# Patient Record
Sex: FEMALE | Race: BLACK OR AFRICAN AMERICAN | NOT HISPANIC OR LATINO | Employment: OTHER | URBAN - METROPOLITAN AREA
[De-identification: names, ages, dates, MRNs, and addresses within clinical notes are randomized per-mention and may not be internally consistent; named-entity substitution may affect disease eponyms.]

---

## 2023-01-31 ENCOUNTER — ANCILLARY PROCEDURE (OUTPATIENT)
Dept: GENERAL RADIOLOGY | Facility: CLINIC | Age: 28
End: 2023-01-31
Attending: PEDIATRICS
Payer: COMMERCIAL

## 2023-01-31 ENCOUNTER — OFFICE VISIT (OUTPATIENT)
Dept: ORTHOPEDICS | Facility: CLINIC | Age: 28
End: 2023-01-31
Payer: COMMERCIAL

## 2023-01-31 VITALS
BODY MASS INDEX: 28.12 KG/M2 | DIASTOLIC BLOOD PRESSURE: 62 MMHG | SYSTOLIC BLOOD PRESSURE: 108 MMHG | WEIGHT: 175 LBS | HEIGHT: 66 IN

## 2023-01-31 DIAGNOSIS — M79.661 PAIN IN RIGHT LOWER LEG: ICD-10-CM
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
## 2023-01-31 DIAGNOSIS — S46.812A TRAPEZIUS STRAIN, LEFT, INITIAL ENCOUNTER: ICD-10-CM

## 2023-01-31 PROCEDURE — 73590 X-RAY EXAM OF LOWER LEG: CPT | Mod: TC | Performed by: RADIOLOGY

## 2023-01-31 PROCEDURE — 99203 OFFICE O/P NEW LOW 30 MIN: CPT | Performed by: PEDIATRICS
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
## 2023-01-31 NOTE — PATIENT INSTRUCTIONS
Left neck/shoulder area pain is more in the area of the upper trapezius, consistent with soft tissue strain.  No concern about neck issues, given the overall range of motion that you have.  No concern about obvious shoulder issues, also given the overall range of motion and function there.  Right lower leg pain suspect related to contusion.  X-ray is reassuring today.  For symptom management, we discussed icing, heat, over-the-counter medication if needed.  Would offer prescription for muscle relaxant, if interested.  Hold for now, but if desiring this, you may contact clinic.  Otherwise, plan physical therapy next, referral placed.  Plan to monitor course with PT 3 to 4 weeks to start.    If you have any further questions for your physician or physician s care team you can contact them thru WhistleTalkhart or by calling  [phone] and use option 3 to leave a voice message.   Messages received during business hours will be returned same day.

## 2024-01-19 ENCOUNTER — OFFICE VISIT (OUTPATIENT)
Dept: INTERNAL MEDICINE | Facility: CLINIC | Age: 29
End: 2024-01-19
Payer: COMMERCIAL

## 2024-01-19 VITALS
OXYGEN SATURATION: 100 % | WEIGHT: 176 LBS | TEMPERATURE: 97.7 F | RESPIRATION RATE: 14 BRPM | SYSTOLIC BLOOD PRESSURE: 101 MMHG | BODY MASS INDEX: 28.41 KG/M2 | DIASTOLIC BLOOD PRESSURE: 71 MMHG | HEART RATE: 100 BPM

## 2024-01-19 DIAGNOSIS — Z11.1 SCREENING FOR TUBERCULOSIS: Primary | ICD-10-CM

## 2024-01-19 DIAGNOSIS — Z11.4 SCREENING FOR HIV (HUMAN IMMUNODEFICIENCY VIRUS): ICD-10-CM

## 2024-01-19 DIAGNOSIS — Z11.59 NEED FOR HEPATITIS C SCREENING TEST: ICD-10-CM

## 2024-01-19 DIAGNOSIS — Z12.4 CERVICAL CANCER SCREENING: ICD-10-CM

## 2024-01-19 PROCEDURE — 99202 OFFICE O/P NEW SF 15 MIN: CPT

## 2024-01-19 PROCEDURE — 86481 TB AG RESPONSE T-CELL SUSP: CPT

## 2024-01-19 PROCEDURE — 36415 COLL VENOUS BLD VENIPUNCTURE: CPT

## 2024-01-19 NOTE — PROGRESS NOTES
"  Assessment & Plan     (Z11.1) Screening for tuberculosis  (primary encounter diagnosis)  Comment: Patient was seen today for TB testing. Patient employer wants patient tested per their policy. Discussed doing Quantiferon TB Gold Plus  Plan: REVIEW OF HEALTH MAINTENANCE PROTOCOL ORDERS,         Quantiferon TB Gold Plus        Pending   Send result to patient email [email or web address]              BMI  Estimated body mass index is 28.41 kg/m  as calculated from the following:    Height as of 1/31/23: 1.676 m (5' 6\").    Weight as of this encounter: 79.8 kg (176 lb).         Nadege Carpenter is a 29 year old, presenting for the following health issues:  Imm/Inj (TB TEST FOR WORK )         No data to display              Imm/Inj    History of Present Illness       Reason for visit:  Tp test    She eats 0-1 servings of fruits and vegetables daily.She consumes 0 sweetened beverage(s) daily. She exercises with enough effort to increase her heart rate 3 or less days per week.   She is taking medications regularly.                 Review of Systems  CONSTITUTIONAL: NEGATIVE for fever, chills, change in weight  ENT/MOUTH: NEGATIVE for ear, mouth and throat problems  RESP: NEGATIVE for significant cough or SOB  CV: NEGATIVE for chest pain, palpitations or peripheral edema      Objective    LMP 01/01/2024   There is no height or weight on file to calculate BMI.  Physical Exam   GENERAL: alert and no distress  EYES: Eyes grossly normal to inspection, PERRL and conjunctivae and sclerae normal  HENT: ear canals and TM's normal, nose and mouth without ulcers or lesions  NECK: no adenopathy, no asymmetry, masses, or scars  RESP: lungs clear to auscultation - no rales, rhonchi or wheezes  CV: regular rate and rhythm, normal S1 S2, no S3 or S4, no murmur, click or rub, no peripheral edema  ABDOMEN: soft, nontender, no hepatosplenomegaly, no masses and bowel sounds normal  MS: no gross musculoskeletal defects noted, no " edema  SKIN: no suspicious lesions or rashes  NEURO: Normal strength and tone, mentation intact and speech normal  PSYCH: mentation appears normal, affect normal/bright  LYMPH: no cervical, supraclavicular, axillary, or inguinal adenopathy    Results for orders placed or performed in visit on 01/19/24   Quantiferon TB Gold Plus     Status: None (In process)    Specimen: Peripheral Blood    Narrative    The following orders were created for panel order Quantiferon TB Gold Plus.  Procedure                               Abnormality         Status                     ---------                               -----------         ------                     Quantiferon TB Gold Plus...[[id number]]                      In process                 Quantiferon TB Gold Plus...[[id number]]                      In process                 Quantiferon TB Gold Plus...[[id number]]                      In process                 Quantiferon TB Gold Plus...[[id number]]                      In process                   Please view results for these tests on the individual orders.           Signed Electronically by: CHIP Carrillo CNP

## 2024-01-21 LAB
GAMMA INTERFERON BACKGROUND BLD IA-ACNC: 0.07 IU/ML
M TB IFN-G BLD-IMP: POSITIVE
M TB IFN-G CD4+ BCKGRND COR BLD-ACNC: 9.93 IU/ML
MITOGEN IGNF BCKGRD COR BLD-ACNC: 0.41 IU/ML
MITOGEN IGNF BCKGRD COR BLD-ACNC: 0.63 IU/ML
QUANTIFERON MITOGEN: 10 IU/ML
QUANTIFERON NIL TUBE: 0.07 IU/ML
QUANTIFERON TB1 TUBE: 0.7 IU/ML
QUANTIFERON TB2 TUBE: 0.48

## 2024-01-22 ENCOUNTER — TELEPHONE (OUTPATIENT)
Dept: FAMILY MEDICINE | Facility: CLINIC | Age: 29
End: 2024-01-22
Payer: COMMERCIAL

## 2024-01-22 ENCOUNTER — TELEPHONE (OUTPATIENT)
Dept: INTERNAL MEDICINE | Facility: CLINIC | Age: 29
End: 2024-01-22
Payer: COMMERCIAL

## 2024-01-22 DIAGNOSIS — R76.12 REACTION TO QUANTIFERON-TB TEST (QFT) WITHOUT ACTIVE TUBERCULOSIS: Primary | ICD-10-CM

## 2024-01-22 NOTE — TELEPHONE ENCOUNTER
Order/Referral Request    Who is requesting: imaging    Orders being requested: referral for x-ray     Reason service is needed/diagnosis: false positive     When are orders needed by: as soon as possible    Has this been discussed with Provider: Yes    Does patient have a preference on a Group/Provider/Facility? none    Does patient have an appointment scheduled?: No    Where to send orders: Place orders within Epic    Okay to leave a detailed message?: Yes at Cell number on file:    Telephone Information:   Mobile [phone]

## 2024-01-22 NOTE — TELEPHONE ENCOUNTER
Hamzah,    See TE from today. Below was already discussed.     There is no xray ordered. Please order.     Thanks,  JESSA Ariza  [facility]

## 2024-01-22 NOTE — TELEPHONE ENCOUNTER
Attempted to call, left vm asking return call.     Thanks,  JESSA Ariza  [facility]

## 2024-01-22 NOTE — TELEPHONE ENCOUNTER
----- Message from CHIP Carrillo CNP sent at 1/22/2024 10:26 AM CST -----  Patient needs to be called and told that her TB test came back positive and we need to do a repeat Quantiferon TB test and a chest Xray. It is common to have a false positive. She was asymptomatic and took the test just for her employer. I will put in the order and she will need to schedule with lab and X-ray.     Thanks,  Gayatri SAUNDERS., CNP

## 2024-01-22 NOTE — TELEPHONE ENCOUNTER
Pt returned call- reviewed lab result with her       Gave her number for x ray scheduling.  Needs to repeat lab as well      JESSA Inman    Triage Nurse  [facility]

## 2024-01-24 ENCOUNTER — ANCILLARY PROCEDURE (OUTPATIENT)
Dept: GENERAL RADIOLOGY | Facility: CLINIC | Age: 29
End: 2024-01-24
Payer: COMMERCIAL

## 2024-01-24 ENCOUNTER — TELEPHONE (OUTPATIENT)
Dept: INTERNAL MEDICINE | Facility: CLINIC | Age: 29
End: 2024-01-24

## 2024-01-24 DIAGNOSIS — R76.12 REACTION TO QUANTIFERON-TB TEST (QFT) WITHOUT ACTIVE TUBERCULOSIS: ICD-10-CM

## 2024-01-24 PROCEDURE — 71046 X-RAY EXAM CHEST 2 VIEWS: CPT | Mod: TC | Performed by: RADIOLOGY

## 2024-01-24 NOTE — TELEPHONE ENCOUNTER
Pt is calling to get an update on the referral that is needed for her to get an x-ray. Pt states she needs this for work so she's hoping to get the orders as soon as possible.

## 2024-01-24 NOTE — TELEPHONE ENCOUNTER
Reason for Call:  Request for results:    Name of test or procedure:  xray. Patient needs to know if she has TB or not for your work. Please email  [email or web address]    Date of test of procedure: 01/24/2024    Location of the test or procedure:  [facility] to leave the result message on voice mail or with a family member? YES    Phone number Patient can be reached at:  Cell number on file:    Telephone Information:   Mobile [phone]       Additional comments: n/A    Call taken on 1/24/2024 at 5:14 PM by DENA STANLEY

## 2024-01-25 ENCOUNTER — LAB (OUTPATIENT)
Dept: LAB | Facility: CLINIC | Age: 29
End: 2024-01-25
Payer: COMMERCIAL

## 2024-01-25 DIAGNOSIS — R76.12 REACTION TO QUANTIFERON-TB TEST (QFT) WITHOUT ACTIVE TUBERCULOSIS: ICD-10-CM

## 2024-01-25 DIAGNOSIS — Z11.59 NEED FOR HEPATITIS C SCREENING TEST: ICD-10-CM

## 2024-01-25 DIAGNOSIS — Z11.4 SCREENING FOR HIV (HUMAN IMMUNODEFICIENCY VIRUS): Primary | ICD-10-CM

## 2024-01-25 LAB — HIV 1+2 AB+HIV1 P24 AG SERPL QL IA: NONREACTIVE

## 2024-01-25 PROCEDURE — 87389 HIV-1 AG W/HIV-1&-2 AB AG IA: CPT

## 2024-01-25 PROCEDURE — 86481 TB AG RESPONSE T-CELL SUSP: CPT

## 2024-01-25 PROCEDURE — 36415 COLL VENOUS BLD VENIPUNCTURE: CPT

## 2024-01-25 PROCEDURE — 86803 HEPATITIS C AB TEST: CPT

## 2024-01-25 NOTE — TELEPHONE ENCOUNTER
Copy of 1-24-24, chest x-ray report printed and left at  for patient to .    Fiorella Menjivar,

## 2024-01-25 NOTE — TELEPHONE ENCOUNTER
TC- Pt will be in clinic at 11:30 today for lab and would like to  a a paper copy of chest x-ray results from 01/24/24. Please print and place at  for patient.       See other telephone encounters from 01/22/24.   Called and spoke with patient. She has already completed chest x-ray. She has not done repeat quantieron TB blood draw yet. Scheduled lab appt for 01/25.    Francine Vu RN

## 2024-01-26 LAB — HCV AB SERPL QL IA: NONREACTIVE

## 2024-01-27 LAB
GAMMA INTERFERON BACKGROUND BLD IA-ACNC: 0.08 IU/ML
M TB IFN-G BLD-IMP: POSITIVE
M TB IFN-G CD4+ BCKGRND COR BLD-ACNC: 9.92 IU/ML
MITOGEN IGNF BCKGRD COR BLD-ACNC: 0.19 IU/ML
MITOGEN IGNF BCKGRD COR BLD-ACNC: 0.46 IU/ML
QUANTIFERON MITOGEN: 10 IU/ML
QUANTIFERON NIL TUBE: 0.08 IU/ML
QUANTIFERON TB1 TUBE: 0.54 IU/ML
QUANTIFERON TB2 TUBE: 0.27

## 2024-03-22 ENCOUNTER — OFFICE VISIT (OUTPATIENT)
Dept: OPTOMETRY | Facility: CLINIC | Age: 29
End: 2024-03-22
Payer: MEDICAID

## 2024-03-22 DIAGNOSIS — H52.223 REGULAR ASTIGMATISM OF BOTH EYES: ICD-10-CM

## 2024-03-22 DIAGNOSIS — H52.13 MYOPIA OF BOTH EYES: ICD-10-CM

## 2024-03-22 DIAGNOSIS — H47.393 OPTIC NERVE CUPPING OF BOTH EYES: ICD-10-CM

## 2024-03-22 DIAGNOSIS — Z01.01 ENCOUNTER FOR EXAMINATION OF EYES AND VISION WITH ABNORMAL FINDINGS: Primary | ICD-10-CM

## 2024-03-22 DIAGNOSIS — H50.111 EXOTROPIA OF RIGHT EYE: ICD-10-CM

## 2024-03-22 PROBLEM — H10.13 ALLERGIC CONJUNCTIVITIS OF BOTH EYES: Status: ACTIVE | Noted: 2020-07-14

## 2024-03-22 PROBLEM — M22.2X1 PATELLOFEMORAL PAIN SYNDROME OF BOTH KNEES: Status: ACTIVE | Noted: 2020-05-11

## 2024-03-22 PROBLEM — R63.1 POLYDIPSIA: Status: ACTIVE | Noted: 2022-05-13

## 2024-03-22 PROBLEM — M22.2X2 PATELLOFEMORAL PAIN SYNDROME OF BOTH KNEES: Status: ACTIVE | Noted: 2020-05-11

## 2024-03-22 PROBLEM — J45.30 MILD PERSISTENT ASTHMA WITHOUT COMPLICATION: Status: ACTIVE | Noted: 2020-07-14

## 2024-03-22 PROBLEM — E66.09 OTHER OBESITY DUE TO EXCESS CALORIES: Status: ACTIVE | Noted: 2020-05-11

## 2024-03-22 PROBLEM — J45.990 EXERCISE INDUCED BRONCHOSPASM: Status: ACTIVE | Noted: 2019-04-29

## 2024-03-22 PROBLEM — M54.50 LUMBAR PAIN: Status: ACTIVE | Noted: 2020-05-11

## 2024-03-22 PROBLEM — M54.2 CERVICALGIA: Status: ACTIVE | Noted: 2020-05-11

## 2024-03-22 PROBLEM — M23.41 LOOSE BODY OF RIGHT KNEE: Status: ACTIVE | Noted: 2019-04-29

## 2024-03-22 PROBLEM — N92.1 MENORRHAGIA WITH IRREGULAR CYCLE: Status: ACTIVE | Noted: 2022-05-13

## 2024-03-22 PROBLEM — R29.898 WEAKNESS OF BOTH LOWER EXTREMITIES: Status: ACTIVE | Noted: 2020-05-11

## 2024-03-22 PROCEDURE — 92004 COMPRE OPH EXAM NEW PT 1/>: CPT | Performed by: OPTOMETRIST

## 2024-03-22 PROCEDURE — 92015 DETERMINE REFRACTIVE STATE: CPT | Performed by: OPTOMETRIST

## 2024-03-22 ASSESSMENT — REFRACTION_MANIFEST
OS_CYLINDER: +1.00
OS_SPHERE: -1.50
OD_CYLINDER: +2.75
OD_SPHERE: -2.75
OS_AXIS: 098
OD_SPHERE: -2.25
OD_AXIS: 080
OS_CYLINDER: +1.00
OD_CYLINDER: +2.25
OD_AXIS: 084
OS_SPHERE: -1.50
OS_AXIS: 107
METHOD_AUTOREFRACTION: 1

## 2024-03-22 ASSESSMENT — CONF VISUAL FIELD
OD_NORMAL: 1
OD_INFERIOR_TEMPORAL_RESTRICTION: 0
OD_INFERIOR_NASAL_RESTRICTION: 0
METHOD: COUNTING FINGERS
OD_SUPERIOR_NASAL_RESTRICTION: 0
OS_INFERIOR_TEMPORAL_RESTRICTION: 0
OS_SUPERIOR_NASAL_RESTRICTION: 0
OS_INFERIOR_NASAL_RESTRICTION: 0
OS_SUPERIOR_TEMPORAL_RESTRICTION: 0
OS_NORMAL: 1
OD_SUPERIOR_TEMPORAL_RESTRICTION: 0

## 2024-03-22 ASSESSMENT — SLIT LAMP EXAM - LIDS
COMMENTS: NORMAL
COMMENTS: NORMAL

## 2024-03-22 ASSESSMENT — EXTERNAL EXAM - LEFT EYE: OS_EXAM: NORMAL

## 2024-03-22 ASSESSMENT — VISUAL ACUITY
OD_SC: 20/80+1
OD_SC: 20/50
OD_SC+: -1
METHOD: SNELLEN - LINEAR
OS_SC: 20/30
OS_SC: 20/20
OS_SC+: -2

## 2024-03-22 ASSESSMENT — KERATOMETRY
OD_AXISANGLE_DEGREES: 078
OD_K2POWER_DIOPTERS: 43.75
OS_AXISANGLE_DEGREES: 102
OS_K1POWER_DIOPTERS: 42.00
OD_K1POWER_DIOPTERS: 41.75
OS_K2POWER_DIOPTERS: 43.25
OD_AXISANGLE2_DEGREES: 168
OS_AXISANGLE2_DEGREES: 012

## 2024-03-22 ASSESSMENT — CUP TO DISC RATIO
OS_RATIO: 0.55
OD_RATIO: 0.6

## 2024-03-22 ASSESSMENT — TONOMETRY
OD_IOP_MMHG: 17
IOP_METHOD: APPLANATION
OS_IOP_MMHG: 15

## 2024-03-22 ASSESSMENT — EXTERNAL EXAM - RIGHT EYE: OD_EXAM: NORMAL

## 2024-03-22 NOTE — PATIENT INSTRUCTIONS
Updated glasses prescription provided today.   Allow 2 weeks to adapt to the new glasses.   Wear glasses for driving.     Large optic nerve cupping with healthy rim. Monitor.     Return in 1 year for a comprehensive eye exam, or sooner if needed.      The effects of the dilating drops last for 4- 6 hours.  You will be more sensitive to light and vision will be blurry up close.  Mydriatic sunglasses were given if needed.     Bunny Carroll, OD  [address]  [place]  [address] [phone]

## 2024-03-22 NOTE — LETTER
3/22/2024         RE: Pauline Bautista  [address] [place] [address]        Dear Colleague,    Thank you for referring your patient, Pauline Bautista, to the [facility]. Please see a copy of my visit note below.    Chief Complaint   Patient presents with     Annual Eye Exam     -PT states she was dx with C/d asymmetry in [place]       Last Eye Exam: 5+ yrs - [place]   Dilated Previously: Yes, side effects of dilation explained today    What are you currently using to see?  does not use glasses or contacts - has had glasses in past but not for 10+ yrs        Distance Vision Acuity: Noticed gradual change in both eyes - much harder time seeing at night     Near Vision Acuity: Satisfied with vision while reading and using computer unaided    Eye Comfort: good  Do you use eye drops? : No  Occupation or Hobbies: home - lots of screen time     Manda Aguirre  Optometry Assistant          Medical, surgical and family histories reviewed and updated 3/22/2024.       OBJECTIVE: See Ophthalmology exam    ASSESSMENT:    ICD-10-CM    1. Encounter for examination of eyes and vision with abnormal findings  Z01.01       2. Exotropia of right eye  H50.111       3. 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 Myopia of both eyes  H52.13       5. Regular astigmatism of both eyes  H52.223           PLAN:     Patient Instructions   Updated glasses prescription provided today.   Allow 2 weeks to adapt to the new glasses.   Wear glasses for driving.     Large optic nerve cupping with healthy rim. Monitor.     Return in 1 year for a comprehensive eye exam, or sooner if needed.      The effects of the dilating drops last for 4- 6 hours.  You will be more sensitive to light and vision will be blurry up close.  Mydriatic sunglasses were given if needed.     Bunny Carroll, OD  [facility]  [address] [place]  [address] [phone]       Again, thank you for allowing me to  participate in the care of your patient.        Sincerely,        Bunny Carroll OD
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
## 2024-04-04 ENCOUNTER — TELEPHONE (OUTPATIENT)
Dept: INTERNAL MEDICINE | Facility: CLINIC | Age: 29
End: 2024-04-04
Payer: COMMERCIAL

## 2024-04-04 NOTE — LETTER
April 4, 2024      Pauline GRIS Ali  [id number]  [place] [address]        April 4, 2024      Pauline GRIS Ali  [address] [facility]  [place] [address]    Your team at [facility] cares about your health. We have reviewed your chart and based on our findings; we are making the following recommendations to better manage your health.     You are in particular need of attention regarding the following:     Schedule a primary care office visit with your provider for a Pap Smear to screen for Cervical Cancer.  PREVENTATIVE VISIT: Physical    If you have already completed these items, please contact the clinic via phone or   [employer] so your care team can review and update your records. Thank you for   choosing [facility] for your healthcare needs. For any questions,   concerns, or to schedule an appointment please contact our clinic.    Healthy Regards,      Your [facility] Care Team              Sincerely,        CHIP Carrillo CNP

## 2024-04-04 NOTE — TELEPHONE ENCOUNTER
Patient Quality Outreach    Patient is due for the following:   Asthma  -  ACT needed  Cervical Cancer Screening - PAP Needed  Physical Preventive Adult Physical    Next Steps:   Schedule a Adult Preventative    Type of outreach:    Sent letter.    Next Steps:  Reach out within 90 days via Letter.    Max number of attempts reached: Yes. Will try again in 90 days if patient still on fail list.    Questions for provider review:    None           Elizabeth Ni, [facility]  Chart routed to closing chart .

## 2024-05-14 ENCOUNTER — APPOINTMENT (OUTPATIENT)
Dept: OPTOMETRY | Facility: CLINIC | Age: 29
End: 2024-05-14
Payer: COMMERCIAL

## 2024-05-14 PROCEDURE — 92340 FIT SPECTACLES MONOFOCAL: CPT | Performed by: OPTOMETRIST

## 2024-05-31 ENCOUNTER — TELEPHONE (OUTPATIENT)
Dept: NURSING | Facility: CLINIC | Age: 29
End: 2024-05-31
Payer: COMMERCIAL

## 2024-05-31 NOTE — TELEPHONE ENCOUNTER
I can't find any call made to patient?  Patient called and informed.     Informed her that she is due for a physical.  See 4-4-24 encounter.  Patient declined to schedule.  Fiorella Menjivar,

## 2024-05-31 NOTE — TELEPHONE ENCOUNTER
Patient Returning Call    Reason for call:  Pt returning call from clinic     Information relayed to patient:  None    Patient has additional questions:  No    What are your questions/concerns:  Pt wants to know the reason for the call      Okay to leave a detailed message?: Yes at Cell number on file:    Telephone Information:   Mobile [phone]

## 2024-12-18 ENCOUNTER — TELEPHONE (OUTPATIENT)
Dept: INTERNAL MEDICINE | Facility: CLINIC | Age: 29
End: 2024-12-18
Payer: COMMERCIAL

## 2024-12-18 NOTE — LETTER
December 18, 2024      Pauline GRIS Ali  [address]  [place] [address]        December 18, 2024      Pauline GRIS Ali  [address] [facility]  [place] [address]    Your team at [facility] cares about your health. We have reviewed your chart and based on our findings; we are making the following recommendations to better manage your health.     You are in particular need of attention regarding the following:     Asthma Control Test     This screening tool helps us to assess how well your asthma is controlled.Good asthma control leads to fewer asthma symptoms and greater health. If your asthma is not in good control (score is 19 or less) or you have been to the ER or urgent care for your asthma, it is recommended you be seen by your provider for medication and lifestyle adjustments.      Please complete and return the attached Asthma Control Test respond below with you answers for each question: Adult ACT     This is valuable information that is requested by your Care Team.    Schedule a primary care office visit with your provider for a Pap Smear to screen for Cervical Cancer.  PREVENTATIVE VISIT: Physical    If you have already completed these items, please contact the clinic via phone or   [employer] so your care team can review and update your records. Thank you for   choosing [facility] for your healthcare needs. For any questions,   concerns, or to schedule an appointment please contact our clinic.    Healthy Regards,      Your [facility] Care Team              Sincerely,        CHIP Carrillo CNP

## 2024-12-18 NOTE — TELEPHONE ENCOUNTER
Patient Quality Outreach    Patient is due for the following:   Asthma  -  ACT needed  Cervical Cancer Screening - PAP Needed  Physical Preventive Adult Physical    Action(s) Taken:   Schedule a Adult Preventative    Type of outreach:    Sent letter.    Questions for provider review:    None           Elizabeth Ni CMA  Chart routed to closing chart  .

## 2025-02-03 ENCOUNTER — OFFICE VISIT (OUTPATIENT)
Dept: FAMILY MEDICINE | Facility: CLINIC | Age: 30
End: 2025-02-03
Payer: COMMERCIAL

## 2025-02-03 VITALS
SYSTOLIC BLOOD PRESSURE: 96 MMHG | OXYGEN SATURATION: 96 % | HEIGHT: 65 IN | HEART RATE: 76 BPM | WEIGHT: 183.6 LBS | RESPIRATION RATE: 18 BRPM | TEMPERATURE: 97.5 F | DIASTOLIC BLOOD PRESSURE: 68 MMHG | BODY MASS INDEX: 30.59 KG/M2

## 2025-02-03 DIAGNOSIS — M54.50 ACUTE RIGHT-SIDED LOW BACK PAIN WITHOUT SCIATICA: ICD-10-CM

## 2025-02-03 DIAGNOSIS — R10.9 FLANK PAIN: ICD-10-CM

## 2025-02-03 DIAGNOSIS — N89.8 VAGINAL ITCHING: Primary | ICD-10-CM

## 2025-02-03 LAB
ALBUMIN UR-MCNC: NEGATIVE MG/DL
APPEARANCE UR: CLEAR
BACTERIA #/AREA URNS HPF: ABNORMAL /HPF
BACTERIAL VAGINOSIS VAG-IMP: NEGATIVE
BILIRUB UR QL STRIP: NEGATIVE
CANDIDA DNA VAG QL NAA+PROBE: NOT DETECTED
CANDIDA GLABRATA / CANDIDA KRUSEI DNA: NOT DETECTED
COLOR UR AUTO: YELLOW
GLUCOSE UR STRIP-MCNC: NEGATIVE MG/DL
HGB UR QL STRIP: ABNORMAL
KETONES UR STRIP-MCNC: NEGATIVE MG/DL
LEUKOCYTE ESTERASE UR QL STRIP: ABNORMAL
NITRATE UR QL: NEGATIVE
PH UR STRIP: 5.5 [PH] (ref 5–7)
RBC #/AREA URNS AUTO: ABNORMAL /HPF
SP GR UR STRIP: 1.01 (ref 1–1.03)
SQUAMOUS #/AREA URNS AUTO: ABNORMAL /LPF
T VAGINALIS DNA VAG QL NAA+PROBE: NOT DETECTED
UROBILINOGEN UR STRIP-ACNC: 0.2 E.U./DL
WBC #/AREA URNS AUTO: ABNORMAL /HPF

## 2025-02-03 PROCEDURE — 99214 OFFICE O/P EST MOD 30 MIN: CPT | Mod: 25 | Performed by: PHYSICIAN ASSISTANT

## 2025-02-03 PROCEDURE — 90471 IMMUNIZATION ADMIN: CPT | Performed by: PHYSICIAN ASSISTANT

## 2025-02-03 PROCEDURE — 90480 ADMN SARSCOV2 VAC 1/ONLY CMP: CPT | Performed by: PHYSICIAN ASSISTANT

## 2025-02-03 PROCEDURE — 90656 IIV3 VACC NO PRSV 0.5 ML IM: CPT | Performed by: PHYSICIAN ASSISTANT

## 2025-02-03 PROCEDURE — 81515 NFCT DS BV&VAGINITIS DNA ALG: CPT | Performed by: PHYSICIAN ASSISTANT

## 2025-02-03 PROCEDURE — 81001 URINALYSIS AUTO W/SCOPE: CPT | Performed by: PHYSICIAN ASSISTANT

## 2025-02-03 PROCEDURE — 91320 SARSCV2 VAC 30MCG TRS-SUC IM: CPT | Performed by: PHYSICIAN ASSISTANT

## 2025-02-03 RX ORDER — FLUCONAZOLE 150 MG/1
TABLET ORAL
Qty: 3 TABLET | Refills: 0 | Status: SHIPPED | OUTPATIENT
Start: 2025-02-03

## 2025-02-03 RX ORDER — FLUCONAZOLE 150 MG/1
150 TABLET ORAL
Qty: 3 TABLET | Refills: 0 | Status: SHIPPED | OUTPATIENT
Start: 2025-02-03 | End: 2025-02-03

## 2025-02-03 ASSESSMENT — ASTHMA QUESTIONNAIRES
EMERGENCY_ROOM_LAST_YEAR_TOTAL: ONE
ACT_TOTALSCORE: 23
QUESTION_2 LAST FOUR WEEKS HOW OFTEN HAVE YOU HAD SHORTNESS OF BREATH: NOT AT ALL
QUESTION_5 LAST FOUR WEEKS HOW WOULD YOU RATE YOUR ASTHMA CONTROL: COMPLETELY CONTROLLED
QUESTION_3 LAST FOUR WEEKS HOW OFTEN DID YOUR ASTHMA SYMPTOMS (WHEEZING, COUGHING, SHORTNESS OF BREATH, CHEST TIGHTNESS OR PAIN) WAKE YOU UP AT NIGHT OR EARLIER THAN USUAL IN THE MORNING: NOT AT ALL
ACT_TOTALSCORE: 23
QUESTION_1 LAST FOUR WEEKS HOW MUCH OF THE TIME DID YOUR ASTHMA KEEP YOU FROM GETTING AS MUCH DONE AT WORK, SCHOOL OR AT HOME: SOME OF THE TIME
QUESTION_4 LAST FOUR WEEKS HOW OFTEN HAVE YOU USED YOUR RESCUE INHALER OR NEBULIZER MEDICATION (SUCH AS ALBUTEROL): NOT AT ALL

## 2025-02-03 ASSESSMENT — PAIN SCALES - GENERAL: PAINLEVEL_OUTOF10: NO PAIN (0)

## 2025-02-03 NOTE — LETTER
February 4, 2025      Pauline Bautista  [address] [address]        Dear ,    We are writing to inform you of your test results.    Your test results fall within the expected range(s) or remain unchanged from previous results.  Use probiotics and be re-evaluated in 1-2 weeks by us or your OBGYN if you are not improving. Please continue with current treatment plan.    Resulted Orders   UA Macroscopic with reflex to Microscopic and Culture - Lab Collect   Result Value Ref Range    Color Urine Yellow Colorless, Straw, Light Yellow, Yellow    Appearance Urine Clear Clear    Glucose Urine Negative Negative mg/dL    Bilirubin Urine Negative Negative    Ketones Urine Negative Negative mg/dL    Specific Gravity Urine 1.015 1.003 - 1.035    Blood Urine Moderate (A) Negative    pH Urine 5.5 5.0 - 7.0    Protein Albumin Urine Negative Negative mg/dL    Urobilinogen Urine 0.2 0.2, 1.0 E.U./dL    Nitrite Urine Negative Negative    Leukocyte Esterase Urine Trace (A) Negative   Multiplex Vaginal Panel by PCR   Result Value Ref Range    Bacterial Vaginosis Organism DNA Negative Negative      Comment:      Indicator DNA target(s) related to bacterial vaginosis organisms is/are not detected.  Organisms associated with bacterial vaginosis that are targeted in this assay include Atopobium spp., Bacterial Vaginosis-Associated Bacterium-2, and Megasphaera-1. Detected organisms are not reported individually.    Candida Group DNA Not Detected Not Detected      Comment:      Candida group species detected by this target include C. albicans, C. tropicalis, C. parapsilosis, C. dubliniensis.     Shawna glabrata / Shawna krusei DNA Not Detected Not Detected    Trichomonas vaginalis DNA Not Detected Not Detected    Narrative    The Xpert  Xpress MVP test, performed on the [employer], is an automated, qualitative in vitro diagnostic test for the detection of DNA targets from anaerobic bacteria associated with  bacterial vaginosis, Candida species associated with vulvovaginal candidiasis, and Trichomonas vaginalis. The assay uses clinician-collected and self-collected vaginal swabs from patients who are symptomatic for vaginitis/ vaginosis. The Xpert  Xpress MVP test utilizes real-time polymerase chain reaction (PCR) for the amplification of specific DNA targets and utilizes fluorogenic target-specific hybridization probes to detect and differentiate DNA. It is intended to aid in the diagnosis of vaginal infections in women with a clinical presentation consistent with bacterial vaginosis, vulvovaginal candidiasis, or trichomoniasis.   The assay targets three anaerobic microorgansims that are associated with bacterial vaginosis (BV). Other organisms that are not detected by the Xpert  Xpress MVP test have also been reported to be associated with BV. The BV organism and Candida species targets of the Xpert  Xpress MVP test can be commensal in women; positive results must be considered in conjunction with other clinical and patient information to determine the disease status.   UA Microscopic with Reflex to Culture   Result Value Ref Range    Bacteria Urine Few (A) None Seen /HPF    RBC Urine 5-10 (A) 0-2 /HPF /HPF    WBC Urine 0-5 0-5 /HPF /HPF    Squamous Epithelials Urine Few (A) None Seen /LPF    Narrative    Urine Culture not indicated       If you have any questions or concerns, please call the clinic at the number listed above.       Sincerely,      FIDELIA Moreira    Electronically signed

## 2025-02-03 NOTE — PROGRESS NOTES
Assessment & Plan   Problem List Items Addressed This Visit    None  Visit Diagnoses       Vaginal itching    -  Primary    Relevant Medications    fluconazole (DIFLUCAN) 150 MG tablet    Other Relevant Orders    Multiplex Vaginal Panel by PCR    Flank pain        Relevant Orders    UA Macroscopic with reflex to Microscopic and Culture - Lab Collect (Completed)    UA Microscopic with Reflex to Culture (Completed)    Acute right-sided low back pain without sciatica        Relevant Orders    Physical Therapy  Referral           Genital itching and discharge  - Likely yeast infection, given the symptoms of itching and white discharge.  - Prescribe fluconazole, one pill every three days until symptoms resolve. Perform a self-swab for further testing.  Multiplex vaginal PCR pending.  -Urinalysis shows microscopic hematuria without clear UTI.  This is likely due to patient's current menstrual cycle.  -Appears well and non-toxic and I have low suspicion for systemic illness or abdominopelvic emergency.  Benign exam today.    Back pain  - Back pain possibly muscular in nature, not associated with fever or recent injury.  No red flags of back pain.  - Recommend Tylenol or ibuprofen for pain management.   -Suggest using ice or a heating pad for relief. Encourage maintaining normal activities to prevent stiffness. Referral to physical therapy for exercises and stretches. Advise follow-up if pain persists beyond four to six weeks.    Vaccinations  - Administer flu and COVID-19 vaccines, one in each arm.    Complete history and physical exam as below. Afebrile with normal vital signs.    DDx and Dx discussed with and explained to the pt to their satisfaction.  All questions were answered at this time. Pt expressed understanding of and agreement with this dx, tx, and plan. No further workup warranted and standard medication warnings given. I have given the patient a list of pertinent indications for re-evaluation. Will  "go to the Emergency Department if symptoms worsen or new concerning symptoms arise. Patient left in no apparent distress.      BMI  Estimated body mass index is 30.59 kg/m  as calculated from the following:    Height as of this encounter: 1.65 m (5' 4.96\").    Weight as of this encounter: 83.3 kg (183 lb 9.6 oz).     See Patient Instructions      Nadege Carpenter is a 30 year old, presenting for the following health issues:  UTI (Went to ER, was given medication. Patient states medications did not help. Itching and dysuria are present. )        2/3/2025     8:37 AM   Additional Questions   Roomed by Anurag Yu CMA   Accompanied by N/A         2/3/2025     8:37 AM   Patient Reported Additional Medications   Patient reports taking the following new medications No new medications.     History of Present Illness       Reason for visit:  I have pain on back and my private place She is missing 5 dose(s) of medications per week.     - Experiencing low back pain for one week, worsens with bending, no known injury  - Itching and discomfort in the genital area for one week, accompanied by white discharge  - Sexually active with one male partner, no condom use, no concerns about sexually transmitted infections  - No history of similar symptoms   - Visited [facility] urgent care in November around Thanksgiving, took Cephalexin for urinary tract infection without improvement  - No fever, chills, abdominal pain, nausea/vomiting, or urinary pain  - Using Tylenol for back pain, which does not wake her at night  - Interested in receiving flu and COVID shots    Review of Systems  Constitutional, HEENT, cardiovascular, pulmonary, gi and gu systems are negative, except as otherwise noted.      Objective    BP 96/68   Pulse 76   Temp 97.5  F (36.4  C) (Oral)   Resp 18   Ht 1.65 m (5' 4.96\")   Wt 83.3 kg (183 lb 9.6 oz)   LMP 02/02/2025   SpO2 96%   Breastfeeding No   BMI 30.59 kg/m    Body mass index is 30.59 " kg/m .  Physical Exam  Vitals and nursing note reviewed.   Constitutional:       General: She is not in acute distress.     Appearance: She is not ill-appearing or diaphoretic.   HENT:      Head: Normocephalic and atraumatic.      Mouth/Throat:      Mouth: Mucous membranes are moist.   Eyes:      Conjunctiva/sclera: Conjunctivae normal.   Cardiovascular:      Rate and Rhythm: Normal rate and regular rhythm.      Heart sounds: Normal heart sounds. No murmur heard.     No friction rub. No gallop.   Pulmonary:      Effort: Pulmonary effort is normal. No respiratory distress.      Breath sounds: Normal breath sounds. No stridor. No wheezing, rhonchi or rales.   Abdominal:      General: Bowel sounds are normal. There is no distension.      Palpations: Abdomen is soft. There is no mass.      Tenderness: There is no abdominal tenderness. There is no guarding or rebound.      Hernia: No hernia is present.   Musculoskeletal:      Comments: No CVA or midline spinal tenderness.  There is mild bilateral lumbar paraspinal muscle tenderness. No overlying signs of trauma or infection per patient report.       Skin:     General: Skin is warm and dry.   Neurological:      General: No focal deficit present.      Mental Status: She is alert. Mental status is at baseline.      Comments: Able to toe lift, heel walk and knee bend.   Psychiatric:         Mood and Affect: Mood normal.         Behavior: Behavior normal.          Results for orders placed or performed in visit on 02/03/25   UA Macroscopic with reflex to Microscopic and Culture - Lab Collect     Status: Abnormal    Specimen: Urine, Clean Catch   Result Value Ref Range    Color Urine Yellow Colorless, Straw, Light Yellow, Yellow    Appearance Urine Clear Clear    Glucose Urine Negative Negative mg/dL    Bilirubin Urine Negative Negative    Ketones Urine Negative Negative mg/dL    Specific Gravity Urine 1.015 1.003 - 1.035    Blood Urine Moderate (A) Negative    pH Urine 5.5  5.0 - 7.0    Protein Albumin Urine Negative Negative mg/dL    Urobilinogen Urine 0.2 0.2, 1.0 E.U./dL    Nitrite Urine Negative Negative    Leukocyte Esterase Urine Trace (A) Negative   UA Microscopic with Reflex to Culture     Status: Abnormal   Result Value Ref Range    Bacteria Urine Few (A) None Seen /HPF    RBC Urine 5-10 (A) 0-2 /HPF /HPF    WBC Urine 0-5 0-5 /HPF /HPF    Squamous Epithelials Urine Few (A) None Seen /LPF    Narrative    Urine Culture not indicated           Signed Electronically by: FIDELIA Moreira

## 2025-02-03 NOTE — PATIENT INSTRUCTIONS
Destinee Carpenter,    Thank you for allowing [facility] to manage your care.    I am unsure of the cause of your symptoms, but your exam is reassuring. We will see what our workup shows.  This is likely a vaginal yeast infection and it should improve with the fluconazole.    If you develop worsening/changing symptoms at any time such as numbness/tingling in the groin, incontinence, fever/chills or other worrisome symptoms, please be seen in clinic/urgent care or call 911/go to the emergency department for evaluation.    I ordered some lab work. Please go to the laboratory to get your studies.    I sent your prescriptions to your pharmacy.    I made a referral to physical therapy. They will be calling in approximately 1 week to set up your appointment.  If you do not hear from them, please call the specialty number on your after visit summary.     Please allow 1-2 business days for our office to contact you in regards to your laboratory/radiological studies.  If not done so, I encourage you to login into [employer] ([email or web address]) to review your results as well.     For your pain, please use Tylenol 1000mg every 6 hours as needed. You may use 600mg of ibuprofen between doses of Tylenol as needed.     Max acetaminophen (Tylenol) 4,000mg/24 hours  Max ibuprofen 3,000mg/24 hours    If you have any questions or concerns, please feel free to call us at [phone]    Sincerely,    Alvarado Schwartz PA-C    Did you know?      You can schedule a video visit for follow-up appointments as well as future appointments for certain conditions.  Please see the below link.     [email or web address]    If you have not already done so,  I encourage you to sign up for [employer] ([email or web address]).  This will allow you to review your results, securely communicate with a provider, and schedule virtual visits as well.

## 2025-03-17 ENCOUNTER — TELEPHONE (OUTPATIENT)
Dept: INTERNAL MEDICINE | Facility: CLINIC | Age: 30
End: 2025-03-17
Payer: COMMERCIAL

## 2025-03-17 NOTE — TELEPHONE ENCOUNTER
Patient Quality Outreach    Patient is due for the following:   Cervical Cancer Screening - PAP Needed  Physical Preventive Adult Physical    Action(s) Taken:   Schedule a Adult Preventative    Type of outreach:    Sent letter.    Questions for provider review:    None           Elizabeth Ni CMA  Chart routed to closing chart .

## 2025-03-17 NOTE — LETTER
March 17, 2025      Pauline GRIS Ali  [address] [facility]  [place] [address]        March 17, 2025      Pauline GRIS Bautista  [address] [facility] [address]    Your team at [facility] cares about your health. We have reviewed your chart and based on our findings; we are making the following recommendations to better manage your health.     You are in particular need of attention regarding the following:     Schedule a primary care office visit with your provider for a Pap Smear to screen for Cervical Cancer.  PREVENTATIVE VISIT: Physical    If you have already completed these items, please contact the clinic via phone or   [employer] so your care team can review and update your records. Thank you for   choosing [facility] for your healthcare needs. For any questions,   concerns, or to schedule an appointment please contact our clinic.    Healthy Regards,      Your [facility] Care Team              Sincerely,        CHIP Carrillo CNP    Electronically signed

## 2025-04-01 ENCOUNTER — TELEPHONE (OUTPATIENT)
Dept: FAMILY MEDICINE | Facility: CLINIC | Age: 30
End: 2025-04-01
Payer: COMMERCIAL

## 2025-04-01 NOTE — TELEPHONE ENCOUNTER
Scheduled 4/2/25 with Alvarado Schwartz PA-C. Please triage.    Scheduling Notes: chest pain; poss heartburn; for a while now

## 2025-04-02 ENCOUNTER — OFFICE VISIT (OUTPATIENT)
Dept: FAMILY MEDICINE | Facility: CLINIC | Age: 30
End: 2025-04-02
Payer: COMMERCIAL

## 2025-04-02 ENCOUNTER — ANCILLARY PROCEDURE (OUTPATIENT)
Dept: GENERAL RADIOLOGY | Facility: CLINIC | Age: 30
End: 2025-04-02
Attending: PHYSICIAN ASSISTANT
Payer: COMMERCIAL

## 2025-04-02 VITALS
SYSTOLIC BLOOD PRESSURE: 102 MMHG | TEMPERATURE: 97.3 F | BODY MASS INDEX: 31.41 KG/M2 | DIASTOLIC BLOOD PRESSURE: 66 MMHG | WEIGHT: 184 LBS | OXYGEN SATURATION: 100 % | HEIGHT: 64 IN | HEART RATE: 68 BPM | RESPIRATION RATE: 20 BRPM

## 2025-04-02 DIAGNOSIS — R07.9 CHEST PAIN, UNSPECIFIED TYPE: Primary | ICD-10-CM

## 2025-04-02 DIAGNOSIS — R07.89 OTHER CHEST PAIN: ICD-10-CM

## 2025-04-02 PROCEDURE — 93000 ELECTROCARDIOGRAM COMPLETE: CPT | Performed by: PHYSICIAN ASSISTANT

## 2025-04-02 PROCEDURE — 99213 OFFICE O/P EST LOW 20 MIN: CPT | Performed by: PHYSICIAN ASSISTANT

## 2025-04-02 PROCEDURE — 1125F AMNT PAIN NOTED PAIN PRSNT: CPT | Performed by: PHYSICIAN ASSISTANT

## 2025-04-02 PROCEDURE — 3078F DIAST BP <80 MM HG: CPT | Performed by: PHYSICIAN ASSISTANT

## 2025-04-02 PROCEDURE — 3074F SYST BP LT 130 MM HG: CPT | Performed by: PHYSICIAN ASSISTANT

## 2025-04-02 PROCEDURE — 71046 X-RAY EXAM CHEST 2 VIEWS: CPT | Mod: TC | Performed by: RADIOLOGY

## 2025-04-02 ASSESSMENT — PAIN SCALES - GENERAL: PAINLEVEL_OUTOF10: SEVERE PAIN (8)

## 2025-04-02 NOTE — PROGRESS NOTES
"  Assessment & Plan   Problem List Items Addressed This Visit    None  Visit Diagnoses       Chest pain, unspecified type    -  Primary    Relevant Orders    EKG 12-lead complete w/read - Clinics (Completed)    Other chest pain        Relevant Orders    XR Chest 2 Views (Completed)    Physical Therapy  Referral           Chest pain, unspecified type:  - Chest pain likely due to msk source given only present when wearing a bra and it improves when not wearing one. EKG nonischemic and without worrisome dysrhythmia and no exertional symptoms or breathing issues reported. Low suspicion for ACS or aortic abnormalities. Low risk for PE on Wells criteria and PERC neg. CXR shows no pneumonia, pneumothorax, pleural effusion, edema, or other worrisome process. No lumps, nipple drainage, or family history of breast cancer noted.  - Recommend trying different types of bras, such as sports bras or more elastic options. Consider referral to a breast surgeon for further evaluation. Suggest physical therapy for chest wall muscular therapy and stretching. Use Tylenol or ibuprofen for pain management. Consider topical treatments like Voltaren gel or numbing medicines.    Complete history and physical exam as below. Afebrile with normal vital signs.    DDx and Dx discussed with and explained to the pt to their satisfaction.  All questions were answered at this time. Pt expressed understanding of and agreement with this dx, tx, and plan. No further workup warranted and standard medication warnings given. I have given the patient a list of pertinent indications for re-evaluation. Will go to the Emergency Department if symptoms worsen or new concerning symptoms arise. Patient left in no apparent distress.     BMI  Estimated body mass index is 31.22 kg/m  as calculated from the following:    Height as of this encounter: 1.635 m (5' 4.37\").    Weight as of this encounter: 83.5 kg (184 lb).     See Patient " "Instructions      Subjective   Pauline is a 30 year old, presenting for the following health issues:  Chest Pain        4/2/2025     2:33 PM   Additional Questions   Roomed by Saima Duncan CMA   Accompanied by N/A         4/2/2025     2:33 PM   Patient Reported Additional Medications   Patient reports taking the following new medications No new medications     Chest Pain      - Pauline A Ali, 30-year-old female.  - Experiencing chest discomfort, worsened when wearing a bra.  - Symptoms have been present for a long time, with worsening noted a few weeks ago.  - Pain improves when not wearing a bra.  - No rash or lumps observed under the breast or axillae.  - No nipple drainage or leakage.  - No exertional symptoms or breathing difficulties reported.  - Pain described as feeling like \"fire\" at times.  - Tried Tylenol for pain relief, which helps.  - Previous EKG performed 13 years ago, results were normal.Patient is coming today due to chest pain that is constant but with no shortness of breath.  Patient reports that it has been ongoing for a few weeks.  Pain is 8/10.      Review of Systems  Constitutional, HEENT, cardiovascular, pulmonary, gi and gu systems are negative, except as otherwise noted.      Objective    /66   Pulse 68   Temp 97.3  F (36.3  C) (Temporal)   Resp 20   Ht 1.635 m (5' 4.37\")   Wt 83.5 kg (184 lb)   LMP 02/02/2025   SpO2 100%   BMI 31.22 kg/m    Body mass index is 31.22 kg/m .  Physical Exam  Vitals and nursing note reviewed.   Constitutional:       General: She is not in acute distress.     Appearance: She is not ill-appearing or diaphoretic.   HENT:      Head: Normocephalic and atraumatic.      Mouth/Throat:      Mouth: Mucous membranes are moist.   Eyes:      Conjunctiva/sclera: Conjunctivae normal.   Cardiovascular:      Rate and Rhythm: Normal rate and regular rhythm.      Heart sounds: Normal heart sounds. No murmur heard.     No friction rub. No gallop.      Comments: 2+ " symmetric radial/PT pulses. No LE edema or tenderness.  Pulmonary:      Effort: Pulmonary effort is normal. No respiratory distress.      Breath sounds: Normal breath sounds. No stridor. No wheezing, rhonchi or rales.   Chest:      Chest wall: Tenderness (reproducible symptoms on palpation. nooverlying signs of trauma/infection per patient.) present.   Skin:     General: Skin is warm and dry.   Neurological:      General: No focal deficit present.      Mental Status: She is alert. Mental status is at baseline.   Psychiatric:         Mood and Affect: Mood normal.         Behavior: Behavior normal.          Results for orders placed or performed in visit on 04/02/25   XR Chest 2 Views     Status: None    Narrative    EXAM: XR CHEST 2 VIEWS  LOCATION: [facility]  DATE: 4/2/2025    INDICATION: Other chest pain.  COMPARISON: January 24, 2024.      Impression    IMPRESSION:   No acute cardiopulmonary process. Mild S-shaped scoliotic curvature of the thoracic spine, apex left in the upper thoracic spine and apex right in the lower thoracic spine.     EKG: appears normal, NSR, inverted Ts v1, normal axis, normal intervals, no acute ST/T changes c/w ischemia, no LVH by voltage criteria, unchanged from previous tracings        Signed Electronically by: FIDELIA Moreira

## 2025-04-02 NOTE — PATIENT INSTRUCTIONS
Destinee Carpenter,    Thank you for allowing [facility] to manage your care.    I am unsure of the cause of your symptoms, but your exam is reassuring. This is likely muscular chest wall pain. We will see what our workup shows.     If you develop worsening/changing symptoms at any time such as shortness of breath, worsening pain, rash, dizziness or other symptoms, please be seen in clinic/urgent care or call 911/go to the emergency department for evaluation.    I ordered some xrays. Please go to our radiology department to get your xrays.    I made a referral to physical therapy. They will be calling in approximately 1 week to set up your appointment.  If you do not hear from them, please call the specialty number on your after visit summary.     Please allow 1-2 business days for our office to contact you in regards to your laboratory/radiological studies.  If not done so, I encourage you to login into [facility] ([email or web address] to review your results as well.     For your pain, please use Tylenol 650mg every 6 hours. You may use 400mg of ibuprofen between doses of Tylenol.     Max acetaminophen (Tylenol) 4,000mg/24 hours  Max ibuprofen 3,000mg/24 hours    If you have any questions or concerns, please feel free to call us at [phone]    Sincerely,    Alvarado Schwartz PA-C    Did you know?      You can schedule a video visit for follow-up appointments as well as future appointments for certain conditions.  Please see the below link.     [email or web address]    If you have not already done so,  I encourage you to sign up for [employer] ([email or web address]).  This will allow you to review your results, securely communicate with a provider, and schedule virtual visits as well.

## 2025-04-03 NOTE — TELEPHONE ENCOUNTER
Patient saw Juvenal Schwartz PA-C. Yesterday for this.    Lakisha ZUÑIGA RN  Triage Nurse  [facility]

## 2025-05-06 ENCOUNTER — OFFICE VISIT (OUTPATIENT)
Dept: FAMILY MEDICINE | Facility: CLINIC | Age: 30
End: 2025-05-06
Payer: COMMERCIAL

## 2025-05-06 VITALS
WEIGHT: 180.2 LBS | TEMPERATURE: 97.6 F | BODY MASS INDEX: 30.02 KG/M2 | HEART RATE: 78 BPM | DIASTOLIC BLOOD PRESSURE: 64 MMHG | OXYGEN SATURATION: 97 % | HEIGHT: 65 IN | SYSTOLIC BLOOD PRESSURE: 118 MMHG | RESPIRATION RATE: 18 BRPM

## 2025-05-06 DIAGNOSIS — N64.4 PAIN OF BOTH BREASTS: Primary | ICD-10-CM

## 2025-05-06 PROCEDURE — 99214 OFFICE O/P EST MOD 30 MIN: CPT | Performed by: FAMILY MEDICINE

## 2025-05-06 PROCEDURE — 3074F SYST BP LT 130 MM HG: CPT | Performed by: FAMILY MEDICINE

## 2025-05-06 PROCEDURE — 3078F DIAST BP <80 MM HG: CPT | Performed by: FAMILY MEDICINE

## 2025-05-06 NOTE — PROGRESS NOTES
Assessment & Plan     Pauline was seen today for breast pain.    Diagnoses and all orders for this visit:    Pain of both breasts  -     MA Diagnostic Digital Bilateral; Future  -     US Breast Bilateral Limited 1-3 Quadrants; Future      Patient will be notified with results and next steps.     Return in about 1 month (around 6/6/2025) for a Physical Exam at your earliest convenience..        Subjective   Pauline is a 30 year old, presenting for the following health issues:  Breast Pain      5/6/2025     8:47 AM   Additional Questions   Roomed by Laila   Accompanied by Self     History of Present Illness       Reason for visit:  My boobs and my chest are hurting She is missing 5 dose(s) of medications per week.        Breast Concern  Onset/Duration: 2 weeks   Description:   Location: 6 o'clock  Pain or tenderness: YES  Redness: No  Intensity: moderate  Progression of Symptoms: worsening  Accompanying Signs & Symptoms:  Any lumps in axillary region: No  Movable: YES  Nipple discharge: No   Changes in the skin or nipple: no  On Hormone therapy: No  Does it change with menstrual cycle: No  Previous history of similar problem: no  First degree relative with breast cancer: a negative family history for breast cancer.  Precipitating factors:           Worsened by: wearing a bra makes it worse  Alleviating factors:            Improved by: no   Therapies tried and outcome: None  No LMP recorded (lmp unknown).      No known personal or family history of breast cancer.     HEALTH CARE MAINTENANCE: overdue for a Physical with a Pap.     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    Social History     Tobacco Use    Smoking status: Never     Passive exposure: Never    Smokeless tobacco: Never   Substance Use Topics    Alcohol use: Never     Family History   Problem Relation Age of Onset    Diabetes No family hx of     Glaucoma No family hx of     Macular Degeneration No family hx of          No current outpatient medications on file.     No Known Allergies      Review of Systems  Constitutional, HEENT, cardiovascular, pulmonary, gi and gu systems are negative, except as otherwise noted.      Objective    /64   Pulse 78   Temp 97.6  F (36.4  C) (Temporal)   Resp 18   Ht 1.645 m (5' 4.76\")   Wt 81.7 kg (180 lb 3.2 oz)   LMP  (LMP Unknown)   SpO2 97%   BMI 30.21 kg/m    Body mass index is 30.21 kg/m .  Physical Exam   GENERAL: alert and no distress  BREAST: Bilateral large appearing breasts, lumpy and tender around the nipples. The right worse than the left. No overlying skin changes. No nipple discharge. No palpable axillary masses or adenopathy  PSYCH: mentation appears normal, affect normal/bright    DATA  Orders completed.         Signed Electronically by: Jeannie Arciniega MD    "

## 2025-05-15 ENCOUNTER — ANCILLARY PROCEDURE (OUTPATIENT)
Dept: ULTRASOUND IMAGING | Facility: CLINIC | Age: 30
End: 2025-05-15
Attending: FAMILY MEDICINE
Payer: COMMERCIAL

## 2025-05-15 DIAGNOSIS — N64.4 PAIN OF BOTH BREASTS: ICD-10-CM
